# Patient Record
(demographics unavailable — no encounter records)

---

## 2025-04-19 NOTE — HISTORY OF PRESENT ILLNESS
[de-identified] : 04/17/2025: The patient is a 79 year old F, who presents today complaining of left knee pain. Date of Injury/Onset: January 2025 Pain:    At Rest:  Varies/10 With Activity:   Varies/10 Mechanism of injury:  Patient reports she was fixing the bed and banged knee against the corner of the frame.  Quality of symptoms: Aching pain Improves with: Acetaminophen  Worse with: Bening  Prior treatment/ Imaging: PCP who ordered x-ray imaging performed at  School/Sport/Position/Occupation: N/A Additional Information: [None]

## 2025-04-19 NOTE — HISTORY OF PRESENT ILLNESS
[de-identified] : 04/17/2025: The patient is a 79 year old F, who presents today complaining of left knee pain. Date of Injury/Onset: January 2025 Pain:    At Rest:  Varies/10 With Activity:   Varies/10 Mechanism of injury:  Patient reports she was fixing the bed and banged knee against the corner of the frame.  Quality of symptoms: Aching pain Improves with: Acetaminophen  Worse with: Bening  Prior treatment/ Imaging: PCP who ordered x-ray imaging performed at  School/Sport/Position/Occupation: N/A Additional Information: [None]

## 2025-04-19 NOTE — DISCUSSION/SUMMARY
[de-identified] : Left X-Ray Examination of the KNEE (4 views): No fractures, subluxation, dislocations. Mild medial and patellofemoral degenerate changes.  Assessment:   The patient is a 79 year old female with physical exam findings consistent with LEFT KNEE ARTHRITIS    - We discussed their diagnosis and treatment options at length including the risks and benefits of both surgical and nonsurgical options. - We will continue conservative treatment with activity modification, PT, icing, weight loss, and anti-inflammatory medications. - The patient was provided with a PT prescription to work on ROM, hip ER/abductors strengthening, quad/hamstring stretches and strengthening, and other exercises. - The patient was advised to let pain guide the gradual advancement of activities. - We discussed the possible of injections in the future. - Meloxicam 15mg rx sent to the patient's pharmacy.   Follow up as needed in 6 weeks as to re-evaluate

## 2025-04-19 NOTE — IMAGING
[de-identified] : LEFT KNEE Inspection: mild effusion Palpation: medial joint line tenderness, anterior tenderness Knee Range of Motion: 3-125  Strength: 5/5 Quadriceps strength, 5/5 Hamstring strength Neurological: light touch is intact throughout Ligament Stability and Special Tests:  McMurrays: neg Lachman: neg Pivot Shift: neg Posterior Drawer: neg Valgus: neg Varus: neg Patella Apprehension: neg Patella Maltracking: neg

## 2025-04-19 NOTE — IMAGING
[de-identified] : LEFT KNEE Inspection: mild effusion Palpation: medial joint line tenderness, anterior tenderness Knee Range of Motion: 3-125  Strength: 5/5 Quadriceps strength, 5/5 Hamstring strength Neurological: light touch is intact throughout Ligament Stability and Special Tests:  McMurrays: neg Lachman: neg Pivot Shift: neg Posterior Drawer: neg Valgus: neg Varus: neg Patella Apprehension: neg Patella Maltracking: neg

## 2025-04-19 NOTE — DISCUSSION/SUMMARY
[de-identified] : Left X-Ray Examination of the KNEE (4 views): No fractures, subluxation, dislocations. Mild medial and patellofemoral degenerate changes.  Assessment:   The patient is a 79 year old female with physical exam findings consistent with LEFT KNEE ARTHRITIS    - We discussed their diagnosis and treatment options at length including the risks and benefits of both surgical and nonsurgical options. - We will continue conservative treatment with activity modification, PT, icing, weight loss, and anti-inflammatory medications. - The patient was provided with a PT prescription to work on ROM, hip ER/abductors strengthening, quad/hamstring stretches and strengthening, and other exercises. - The patient was advised to let pain guide the gradual advancement of activities. - We discussed the possible of injections in the future. - Meloxicam 15mg rx sent to the patient's pharmacy.   Follow up as needed in 6 weeks as to re-evaluate